# Patient Record
Sex: FEMALE | Race: WHITE | NOT HISPANIC OR LATINO | Employment: STUDENT | URBAN - METROPOLITAN AREA
[De-identification: names, ages, dates, MRNs, and addresses within clinical notes are randomized per-mention and may not be internally consistent; named-entity substitution may affect disease eponyms.]

---
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
## 2023-02-13 ENCOUNTER — OFFICE VISIT (OUTPATIENT)
Dept: OBSTETRICS AND GYNECOLOGY | Facility: CLINIC | Age: 18
End: 2023-02-13
Payer: COMMERCIAL

## 2023-02-13 VITALS — SYSTOLIC BLOOD PRESSURE: 112 MMHG | DIASTOLIC BLOOD PRESSURE: 70 MMHG | WEIGHT: 153.31 LBS

## 2023-02-13 DIAGNOSIS — Z30.431 IUD CHECK UP: Primary | ICD-10-CM

## 2023-02-13 PROCEDURE — 3078F PR MOST RECENT DIASTOLIC BLOOD PRESSURE < 80 MM HG: ICD-10-PCS | Mod: CPTII,S$GLB,,

## 2023-02-13 PROCEDURE — 1160F PR REVIEW ALL MEDS BY PRESCRIBER/CLIN PHARMACIST DOCUMENTED: ICD-10-PCS | Mod: CPTII,S$GLB,,

## 2023-02-13 PROCEDURE — 3078F DIAST BP <80 MM HG: CPT | Mod: CPTII,S$GLB,,

## 2023-02-13 PROCEDURE — 99212 OFFICE O/P EST SF 10 MIN: CPT | Mod: S$GLB,,,

## 2023-02-13 PROCEDURE — 1159F PR MEDICATION LIST DOCUMENTED IN MEDICAL RECORD: ICD-10-PCS | Mod: CPTII,S$GLB,,

## 2023-02-13 PROCEDURE — 99999 PR PBB SHADOW E&M-EST. PATIENT-LVL II: CPT | Mod: PBBFAC,,,

## 2023-02-13 PROCEDURE — 3074F PR MOST RECENT SYSTOLIC BLOOD PRESSURE < 130 MM HG: ICD-10-PCS | Mod: CPTII,S$GLB,,

## 2023-02-13 PROCEDURE — 1159F MED LIST DOCD IN RCRD: CPT | Mod: CPTII,S$GLB,,

## 2023-02-13 PROCEDURE — 1160F RVW MEDS BY RX/DR IN RCRD: CPT | Mod: CPTII,S$GLB,,

## 2023-02-13 PROCEDURE — 3074F SYST BP LT 130 MM HG: CPT | Mod: CPTII,S$GLB,,

## 2023-02-13 PROCEDURE — 99212 PR OFFICE/OUTPT VISIT, EST, LEVL II, 10-19 MIN: ICD-10-PCS | Mod: S$GLB,,,

## 2023-02-13 PROCEDURE — 99999 PR PBB SHADOW E&M-EST. PATIENT-LVL II: ICD-10-PCS | Mod: PBBFAC,,,

## 2023-02-13 RX ORDER — ISOTRETINOIN 40 MG/1
40 CAPSULE, LIQUID FILLED ORAL
COMMUNITY
Start: 2023-01-27

## 2023-02-13 NOTE — PROGRESS NOTES
Subjective:       Patient ID: Zofia Landis is a 17 y.o. female.    Chief Complaint:  IUD Check      History of Present Illness  HPI  Ms. Zofia Landis is a 16 y/o female who presents today as follow up for IUD placement. She missed her IUD check in October because she got busy in school. She was last seen on 2022 by Dr Silver at which time Noelle IUD was placed without complications. She denies complications since placement. She reports that she bled for a few weeks after placement then stopped.     GYN & OB History  Patient's last menstrual period was 2023 (exact date).   Date of Last Pap: No result found    OB History    Para Term  AB Living   0 0 0 0 0 0   SAB IAB Ectopic Multiple Live Births   0 0 0 0 0     Past Medical History:  Past Medical History:   Diagnosis Date    Allergy        Past Surgical History:  Past Surgical History:   Procedure Laterality Date    APPLICATION OF CARTILAGE GRAFT N/A 2020    Procedure: APPLICATION, CARTILAGE GRAFT;  Surgeon: Armaan Richardson III, MD;  Location: [facility];  Service: Plastics;  Laterality: N/A;    NASAL RECONSTRUCTION N/A 2020    Procedure: RECONSTRUCTION, NOSE with possible osteotomies;  Surgeon: Armaan Richardson III, MD;  Location: [facility];  Service: Plastics;  Laterality: N/A;  FOLLOW DR. RICHARDSON ANESTHESIA   PROTOCAL    NASAL SEPTOPLASTY N/A 2020    Procedure: SEPTOPLASTY, NOSE;  Surgeon: Armaan Rcihardson III, MD;  Location: [facility];  Service: Plastics;  Laterality: N/A;       Family History:  Family History   Problem Relation Age of Onset    No Known Problems Mother     No Known Problems Father     Cataracts Paternal Grandmother     Cataracts Paternal Grandfather     Congenital heart disease Sister         TOF     Diabetes Neg Hx     Glaucoma Neg Hx     Macular degeneration Neg Hx     Retinal detachment Neg Hx     Blindness Neg Hx        Allergies:  Review of patient's allergies indicates:  No Known  Allergies    Medications:  Current Outpatient Medications on File Prior to Visit   Medication Sig Dispense Refill    MYORISAN 40 mg capsule Take 40 mg by mouth.      doxycycline (VIBRA-TABS) 100 MG tablet Take 100 mg by mouth once daily.      valACYclovir (VALTREX) 500 MG tablet Take 2 tablets (1,000 mg total) by mouth once daily. for 5 days 10 tablet 0    [DISCONTINUED] dapsone (ACZONE) 5 % topical gel Apply topically 2 (two) times daily. (Patient not taking: Reported on 8/9/2022) 60 g 2    [DISCONTINUED] hydrocortisone 2.5 % cream Apply topically 2 (two) times daily. Up to 2 weeks at a time (Patient not taking: Reported on 8/9/2022) 30 g 1    [DISCONTINUED] norethindrone-ethinyl estradiol (MICROGESTIN 1/20) 1-20 mg-mcg per tablet Take 1 tablet by mouth once daily. (Patient not taking: Reported on 8/9/2022) 30 tablet 11     Current Facility-Administered Medications on File Prior to Visit   Medication Dose Route Frequency Provider Last Rate Last Admin    levonorgestreL (SYD) 14 mcg/24 hrs (3 yrs) 13.5 mg IUD 14 mcg  14 mcg Intrauterine  Serena Silver MD   14 mcg at 08/09/22 6745       Social History:  Social History     Tobacco Use    Smoking status: Never    Smokeless tobacco: Never   Substance Use Topics    Alcohol use: No    Drug use: No        Review of Systems  Review of Systems   Constitutional:  Negative for activity change and appetite change.   Respiratory:  Negative for shortness of breath.    Cardiovascular:  Negative for chest pain.   Gastrointestinal:  Negative for abdominal pain, diarrhea and nausea.   Genitourinary:  Negative for bladder incontinence, decreased libido, dysmenorrhea, dyspareunia, dysuria, flank pain, frequency, genital sores, hematuria, hot flashes, menorrhagia, menstrual problem, pelvic pain, urgency, vaginal bleeding, vaginal discharge, vaginal pain, urinary incontinence, postcoital bleeding, postmenopausal bleeding, vaginal dryness and vaginal odor.   Integumentary:   Negative for breast tenderness.   Neurological:  Negative for headaches.   Breast: Negative for breast self exam and tenderness        Objective:    Physical Exam:   Constitutional: She is oriented to person, place, and time. She appears well-developed and well-nourished.    HENT:   Head: Normocephalic.      Cardiovascular:       Exam reveals no clubbing.        Pulmonary/Chest: Effort normal and breath sounds normal.        Abdominal: Soft. There is no abdominal tenderness. Hernia confirmed negative in the right inguinal area and confirmed negative in the left inguinal area.     Genitourinary:    Inguinal canal, vagina, uterus, right adnexa, left adnexa and rectum normal.   Rectum:      No tenderness.      Pelvic exam was performed with patient supine.   The external female genitalia was normal.   No external genitalia lesions identified,Genitalia hair distrobution normal .   Labial bartholins normal.Cervix is normal. No  no vaginal discharge or tenderness in the vagina.    No signs of injury in the vagina.   Vagina was moist.Cervix exhibits no discharge and no tenderness. Uterus is not tender. Normal urethral meatus.Urethra findings: no tendernessBladder findings: no bladder tendernessIUD strings visualized.          Musculoskeletal: Normal range of motion and moves all extremeties.      Lymphadenopathy: No inguinal adenopathy noted on the right or left side.    Neurological: She is alert and oriented to person, place, and time.    Skin: Skin is warm. Nails show no clubbing.    Psychiatric: She has a normal mood and affect. Her behavior is normal. Judgment and thought content normal.        Assessment:        1. IUD check up               Plan:      1. IUD check up     Patient counseled on IUD danger signs and how to check the strings reviewed.    Return for annual GYN exam

## 2023-04-24 LAB
CHOL/HDLC RATIO: 3.1
CHOLESTEROL, TOTAL: 163
EGFR: 114 ML/MIN/1.73M2
HDLC SERPL-MCNC: 53 MG/DL
LDLC SERPL CALC-MCNC: 88 MG/DL
NONHDLC SERPL-MCNC: 110 MG/DL
TRIGL SERPL-MCNC: 128 MG/DL

## 2023-05-25 LAB
CHOL/HDLC RATIO: 2.7
CHOLESTEROL, TOTAL: 153
EGFR: 106 ML/MIN/1.73M2
HDLC SERPL-MCNC: 57 MG/DL
LDLC SERPL CALC-MCNC: 80 MG/DL
NONHDLC SERPL-MCNC: 96 MG/DL
TRIGL SERPL-MCNC: 77 MG/DL

## 2024-02-27 ENCOUNTER — PATIENT OUTREACH (OUTPATIENT)
Dept: ADMINISTRATIVE | Facility: HOSPITAL | Age: 19
End: 2024-02-27
Payer: COMMERCIAL

## 2024-02-27 RX ORDER — DEXTROMETHORPHAN HBR AND PYRILAMINE MALEATE 7.5; 7.5 MG/5ML; MG/5ML
LIQUID ORAL
COMMUNITY
Start: 2023-10-20

## 2024-02-27 RX ORDER — FLUTICASONE PROPIONATE 50 MCG
SPRAY, SUSPENSION (ML) NASAL
COMMUNITY
Start: 2023-10-19

## 2024-02-27 RX ORDER — CETIRIZINE HYDROCHLORIDE 10 MG/1
10 TABLET ORAL EVERY MORNING
COMMUNITY
Start: 2023-10-19

## 2024-02-27 NOTE — PROGRESS NOTES
Sebastien Panel Report 02.16.24 - Left message for patient to call our office. Please schedule annual physical.

## 2024-04-15 ENCOUNTER — PATIENT MESSAGE (OUTPATIENT)
Dept: PEDIATRICS | Facility: CLINIC | Age: 19
End: 2024-04-15
Payer: COMMERCIAL

## 2024-07-10 DIAGNOSIS — Z11.59 NEED FOR HEPATITIS C SCREENING TEST: ICD-10-CM

## 2024-07-15 ENCOUNTER — OFFICE VISIT (OUTPATIENT)
Dept: FAMILY MEDICINE | Facility: CLINIC | Age: 19
End: 2024-07-15
Payer: COMMERCIAL

## 2024-07-15 DIAGNOSIS — R10.32 LEFT LOWER QUADRANT ABDOMINAL PAIN: Primary | ICD-10-CM

## 2024-07-15 DIAGNOSIS — M54.50 ACUTE RIGHT-SIDED LOW BACK PAIN WITHOUT SCIATICA: ICD-10-CM

## 2024-07-15 PROCEDURE — 99214 OFFICE O/P EST MOD 30 MIN: CPT | Mod: 95,,, | Performed by: INTERNAL MEDICINE

## 2024-07-15 PROCEDURE — 1160F RVW MEDS BY RX/DR IN RCRD: CPT | Mod: CPTII,95,, | Performed by: INTERNAL MEDICINE

## 2024-07-15 PROCEDURE — 1159F MED LIST DOCD IN RCRD: CPT | Mod: CPTII,95,, | Performed by: INTERNAL MEDICINE

## 2024-07-16 RX ORDER — CYCLOBENZAPRINE HCL 5 MG
5 TABLET ORAL 3 TIMES DAILY PRN
Qty: 30 TABLET | Refills: 0 | Status: SHIPPED | OUTPATIENT
Start: 2024-07-16 | End: 2024-07-26

## 2024-07-17 ENCOUNTER — HOSPITAL ENCOUNTER (OUTPATIENT)
Dept: RADIOLOGY | Facility: HOSPITAL | Age: 19
Discharge: HOME | End: 2024-07-17
Attending: INTERNAL MEDICINE
Payer: COMMERCIAL

## 2024-07-17 DIAGNOSIS — R10.32 LEFT LOWER QUADRANT ABDOMINAL PAIN: ICD-10-CM

## 2024-07-19 ENCOUNTER — TELEPHONE (OUTPATIENT)
Dept: FAMILY MEDICINE | Facility: CLINIC | Age: 19
End: 2024-07-19
Payer: COMMERCIAL

## 2024-07-19 PROBLEM — R10.32 LEFT LOWER QUADRANT ABDOMINAL PAIN: Status: ACTIVE | Noted: 2024-07-19

## 2024-07-19 PROBLEM — M54.50 ACUTE RIGHT-SIDED LOW BACK PAIN WITHOUT SCIATICA: Status: ACTIVE | Noted: 2024-07-19

## 2024-07-19 NOTE — TELEPHONE ENCOUNTER
----- Message from Angela Vega sent at 7/19/2024 11:19 AM CDT -----  Contact: Zofia  .Patient is calling to speak with the nurse regarding orders. Reports needing order for pelvis scan so the patient can schedule. Please give patient a call back at   [phone]

## 2024-07-19 NOTE — PROGRESS NOTES
Chief Complaint: No chief complaint on file.      Patient Location: [place]   Audio or Audiovisual and Time: Audiovisual, 15 mins     Zofia Landis  is a 19 y.o. year old patient who presents today for lower left abd pain     Patient has been having right lower back pain for about two weeks. Patient reports lower left abd/pelvic pain which started after her right back pain. The back pain is improving. The abd pain is intermittent.     Past Medical History:   Diagnosis Date    Allergy        Past Surgical History:   Procedure Laterality Date    APPLICATION OF CARTILAGE GRAFT N/A 6/26/2020    Procedure: APPLICATION, CARTILAGE GRAFT;  Surgeon: Armaan Richardson III, MD;  [facility];  Service: Plastics;  Laterality: N/A;    NASAL RECONSTRUCTION N/A 6/26/2020    Procedure: RECONSTRUCTION, NOSE with possible osteotomies;  Surgeon: Armaan Richardson III, MD;  [facility];  Service: Plastics;  Laterality: N/A;  FOLLOW DR. RICHARDSON ANESTHESIA   PROTOCAL    NASAL SEPTOPLASTY N/A 6/26/2020    Procedure: SEPTOPLASTY, NOSE;  Surgeon: Armaan Richardson III, MD;  [facility];  Service: Plastics;  Laterality: N/A;        Family History   Problem Relation Name Age of Onset    No Known Problems Mother ria     No Known Problems Father eris     Cataracts Paternal Grandmother      Cataracts Paternal Grandfather      Congenital heart disease Sister olvia         TOF     Diabetes Neg Hx      Glaucoma Neg Hx      Macular degeneration Neg Hx      Retinal detachment Neg Hx      Blindness Neg Hx          Social History     Socioeconomic History    Marital status: Single   Tobacco Use    Smoking status: Never    Smokeless tobacco: Never   Substance and Sexual Activity    Alcohol use: No    Drug use: No   Social History Narrative    She is in college at [employer].     Social Determinants of Health     Financial Resource Strain: Low Risk  (7/15/2024)    Overall Financial Resource Strain ([facility])      Difficulty of Paying Living Expenses: Not hard at all   Food Insecurity: No Food Insecurity (7/15/2024)    Hunger Vital Sign     Worried About Running Out of Food in the Last Year: Never true     Ran Out of Food in the Last Year: Never true   Physical Activity: Sufficiently Active (7/15/2024)    Exercise Vital Sign     Days of Exercise per Week: 5 days     Minutes of Exercise per Session: 60 min   Stress: No Stress Concern Present (7/15/2024)    [employer] - Occupational Stress Questionnaire     Feeling of Stress : Not at all   Housing Stability: Unknown (7/15/2024)    Housing Stability Vital Sign     Unable to Pay for Housing in the Last Year: No         Current Outpatient Medications:     CAPRON DM 7.5-7.5 mg/5 mL Liqd, SMARTSIG:10 Milliliter(s) By Mouth Every 8 Hours PRN, Disp: , Rfl:     cetirizine (ZYRTEC) 10 MG tablet, Take 10 mg by mouth every morning., Disp: , Rfl:     cyclobenzaprine (FLEXERIL) 5 MG tablet, Take 1 tablet (5 mg total) by mouth 3 (three) times daily as needed for Muscle spasms., Disp: 30 tablet, Rfl: 0    doxycycline (VIBRA-TABS) 100 MG tablet, Take 100 mg by mouth once daily., Disp: , Rfl:     fluticasone propionate (FLONASE) 50 mcg/actuation nasal spray, by Each Nostril route., Disp: , Rfl:     IBUPROFEN ORAL, Take by mouth., Disp: , Rfl:     MYORISAN 40 mg capsule, Take 40 mg by mouth., Disp: , Rfl:     valACYclovir (VALTREX) 500 MG tablet, Take 2 tablets (1,000 mg total) by mouth once daily. for 5 days, Disp: 10 tablet, Rfl: 0    Current Facility-Administered Medications:     levonorgestreL (SYD) 14 mcg/24 hrs (3 yrs) 13.5 mg IUD 14 mcg, 14 mcg, Intrauterine, , Serena Silver MD, 14 mcg at 08/09/22 1546     Review of Systems   Gastrointestinal:  Positive for abdominal pain. Negative for blood in stool, constipation, diarrhea, melena, nausea and vomiting.   Genitourinary:  Negative for dysuria.   Musculoskeletal:  Positive for back pain and myalgias.         Physical Exam not conducted due to virtual visit    Assessment:       1. Left lower quadrant abdominal pain    2. Acute right-sided low back pain without sciatica          Plan:   1. Left lower quadrant abdominal pain  Assessment & Plan:  Acute, intermittent, unresolved. Likely hip pain from compensating right back pain? Possible gyn source of pain?   Patient denies melena, constipation or blood in the stool     - US abd     Orders:  -     Cancel: US Abdomen Limited; Future; Expected date: 07/16/2024    2. Acute right-sided low back pain without sciatica  Assessment & Plan:  Acute, improving.     - counseled on stretches   - start flexeril PRN   - continue ibuprofen PRN    Orders:  -     cyclobenzaprine (FLEXERIL) 5 MG tablet; Take 1 tablet (5 mg total) by mouth 3 (three) times daily as needed for Muscle spasms.  Dispense: 30 tablet; Refill: 0         No follow-ups on file.

## 2024-07-19 NOTE — ASSESSMENT & PLAN NOTE
Acute, intermittent, unresolved. Likely hip pain from compensating right back pain? Possible gyn source of pain?   Patient denies melena, constipation or blood in the stool     - US abd

## 2024-07-19 NOTE — TELEPHONE ENCOUNTER
Informed pt orders are in the system; she states she doesn't see it listed in her le[facility]ne so she can schedule; when I looked further it was scheduled for 7/17 in [place]; I was unable to unlink the order and pt able to see it in her portal to schedule

## 2024-08-08 ENCOUNTER — PATIENT OUTREACH (OUTPATIENT)
Dept: ADMINISTRATIVE | Facility: HOSPITAL | Age: 19
End: 2024-08-08
Payer: COMMERCIAL

## 2024-08-15 ENCOUNTER — OFFICE VISIT (OUTPATIENT)
Dept: OBSTETRICS AND GYNECOLOGY | Facility: CLINIC | Age: 19
End: 2024-08-15
Payer: COMMERCIAL

## 2024-08-15 VITALS
BODY MASS INDEX: 26.47 KG/M2 | WEIGHT: 164.69 LBS | DIASTOLIC BLOOD PRESSURE: 70 MMHG | HEIGHT: 66 IN | SYSTOLIC BLOOD PRESSURE: 116 MMHG

## 2024-08-15 DIAGNOSIS — R10.2 PELVIC PAIN: ICD-10-CM

## 2024-08-15 DIAGNOSIS — Z01.419 WELL WOMAN EXAM WITH ROUTINE GYNECOLOGICAL EXAM: Primary | ICD-10-CM

## 2024-08-15 DIAGNOSIS — Z11.3 SCREENING EXAMINATION FOR STD (SEXUALLY TRANSMITTED DISEASE): ICD-10-CM

## 2024-08-15 DIAGNOSIS — Z12.39 SCREENING BREAST EXAMINATION: ICD-10-CM

## 2024-08-15 PROCEDURE — 88175 CYTOPATH C/V AUTO FLUID REDO: CPT

## 2024-08-15 PROCEDURE — 87491 CHLMYD TRACH DNA AMP PROBE: CPT

## 2024-08-15 PROCEDURE — 3008F BODY MASS INDEX DOCD: CPT | Mod: CPTII,S$GLB,,

## 2024-08-15 PROCEDURE — 3078F DIAST BP <80 MM HG: CPT | Mod: CPTII,S$GLB,,

## 2024-08-15 PROCEDURE — 99395 PREV VISIT EST AGE 18-39: CPT | Mod: S$GLB,,,

## 2024-08-15 PROCEDURE — 99999 PR PBB SHADOW E&M-EST. PATIENT-LVL III: CPT | Mod: PBBFAC,,,

## 2024-08-15 PROCEDURE — 3074F SYST BP LT 130 MM HG: CPT | Mod: CPTII,S$GLB,,

## 2024-08-15 PROCEDURE — 1159F MED LIST DOCD IN RCRD: CPT | Mod: CPTII,S$GLB,,

## 2024-08-15 NOTE — PROGRESS NOTES
CC: Annual  HISTORY OF PRESENT ILLNESS:    Zofia Landis is a 19 y.o. female, , Patient's last menstrual period was 2024 (exact date).,  presents for a routine exam and has complaints of pelvic pain that started about 2-3 weeks ago. She also reports discharge and odor.   PCP ordered a PUS with normal results and intact IUD on .   FINDINGS:  Uterus measures 6.9 x 2.7 x 3.8 cm.  Endometrium measures 0.3 cm.  IUD is present. Right ovary measures 3.4 x 1.8 x 1.9 cm.  Left ovary measures 2.8 x 1.5 x 2.6 cm.  Normal vascular flow is noted to the ovaries.  No adnexal masses or free fluid.  Impression:1. No abnormality demonstrated.     She is sexually active. She uses Noelle IUD for contraception.  History of STDs: denies.  She does want STD screening.  Denies any other GYN complaints.      The patient participates in regular exercise: yes.  The patient does not smoke.  The patient wears seatbelts.   Pt denies any domestic violence. reports tattoos or blood transfusions    SCREENING:   Pap: first today   Gardasil Status:  VACCINATED x1 (needs 2 more doses)  Mammogram: N/A  Colonoscopy: N/A   DEXA:  N/A     GYN FH:   Breast cancer: MGM  Colon cancer: none  Ovarian cancer: none  Endometrial cancer: none     OB History    Para Term  AB Living   0 0 0 0 0 0   SAB IAB Ectopic Multiple Live Births   0 0 0 0 0        Past Medical History:  Past Medical History:   Diagnosis Date    Allergy        Past Surgical History:  Past Surgical History:   Procedure Laterality Date    APPLICATION OF CARTILAGE GRAFT N/A 2020    Procedure: APPLICATION, CARTILAGE GRAFT;  Surgeon: Armaan Richardson III, MD;  Location: [facility] OR;  Service: Plastics;  Laterality: N/A;    NASAL RECONSTRUCTION N/A 2020    Procedure: RECONSTRUCTION, NOSE with possible osteotomies;  Surgeon: Armaan Richardson III, MD;  Location: [facility];  Service: Plastics;  Laterality: N/A;  FOLLOW DR. RICHARDSON ANESTHESIA   PROTOCAL    NASAL  SEPTOPLASTY N/A 6/26/2020    Procedure: SEPTOPLASTY, NOSE;  Surgeon: Armaan Richardson III, MD;  Location: [facility] OR;  Service: Plastics;  Laterality: N/A;       Family History:  Family History   Problem Relation Name Age of Onset    No Known Problems Mother ria     No Known Problems Father eris     Congenital heart disease Sister florentino         TOF     Breast cancer Maternal Grandmother      Cataracts Paternal Grandmother      Cataracts Paternal Grandfather      Diabetes Neg Hx      Glaucoma Neg Hx      Macular degeneration Neg Hx      Retinal detachment Neg Hx      Blindness Neg Hx      Colon cancer Neg Hx      Ovarian cancer Neg Hx      Cervical cancer Neg Hx      Uterine cancer Neg Hx         Allergies:  Review of patient's allergies indicates:  No Known Allergies    Medications:  Current Outpatient Medications on File Prior to Visit   Medication Sig Dispense Refill    valACYclovir (VALTREX) 500 MG tablet Take 2 tablets (1,000 mg total) by mouth once daily. for 5 days 10 tablet 0    [DISCONTINUED] CAPRON DM 7.5-7.5 mg/5 mL Liqd SMARTSIG:10 Milliliter(s) By Mouth Every 8 Hours PRN (Patient not taking: Reported on 8/15/2024)      [DISCONTINUED] cetirizine (ZYRTEC) 10 MG tablet Take 10 mg by mouth every morning. (Patient not taking: Reported on 8/15/2024)      [DISCONTINUED] doxycycline (VIBRA-TABS) 100 MG tablet Take 100 mg by mouth once daily. (Patient not taking: Reported on 8/15/2024)      [DISCONTINUED] fluticasone propionate (FLONASE) 50 mcg/actuation nasal spray by Each Nostril route. (Patient not taking: Reported on 8/15/2024)      [DISCONTINUED] IBUPROFEN ORAL Take by mouth. (Patient not taking: Reported on 8/15/2024)      [DISCONTINUED] MYORISAN 40 mg capsule Take 40 mg by mouth. (Patient not taking: Reported on 8/15/2024)       Current Facility-Administered Medications on File Prior to Visit   Medication Dose Route Frequency Provider Last Rate Last Admin    levonorgestreL (SYD) 14 mcg/24 hrs (3  yrs) 13.5 mg IUD 14 mcg  14 mcg Intrauterine  Serena Silver MD   14 mcg at 08/09/22 9616       Social History:  Social History     Tobacco Use    Smoking status: Never     Passive exposure: Never    Smokeless tobacco: Never   Substance Use Topics    Alcohol use: No    Drug use: No               ROS:  GENERAL: Feeling well overall. Denies fever or chills.   SKIN: Denies rash or lesions.   HEAD: Denies head injury or headache.   NODES: Denies enlarged lymph nodes.   CHEST: Denies chest pain or shortness of breath.   CARDIOVASCULAR: Denies palpitations or left sided chest pain.   ABDOMEN: No abdominal pain, constipation, diarrhea, nausea, vomiting or rectal bleeding.   URINARY: No dysuria, hematuria, or burning on urination.  REPRODUCTIVE: See HPI.   BREASTS: Denies pain, lumps, or nipple discharge.   HEMATOLOGIC: No easy bruisability or excessive bleeding.   MUSCULOSKELETAL: Denies joint pain or swelling.   NEUROLOGIC: Denies syncope or weakness.   PSYCHIATRIC: Denies depression, anxiety or mood swings.    PE:   APPEARANCE: Well nourished, well developed, White female in no acute distress.  NODES: no cervical, supraclavicular, or inguinal lymphadenopathy  BREASTS: Symmetrical, no skin changes or visible lesions. No palpable masses, nipple discharge or adenopathy bilaterally.  ABDOMEN: Soft. No tenderness or masses. No distention. No hernias palpated. No CVA tenderness.  VULVA: No lesions. Normal external female genitalia.  URETHRAL MEATUS: Normal size and location, no lesions, no prolapse.  URETHRA: No masses, tenderness, or prolapse.  VAGINA:  Moist. No lesions or lacerations noted. + thin white discharge present.   CERVIX: No lesions or discharge. No cervical motion tenderness. IUD strings visible at os (2 cm out)  UTERUS: Normal size, regular shape, mobile, non-tender.  ADNEXA: No tenderness. No fullness or masses palpated in the adnexal regions.   ANUS PERINEUM: Normal.      Diagnosis:  1. Well woman exam with  routine gynecological exam    2. Screening examination for STD (sexually transmitted disease)    3. Screening breast examination    4. Pelvic pain        Plan:     Orders Placed This Encounter    C. trachomatis/N. gonorrhoeae by AMP DNA [facility]; Cervicovaginal    Liquid-Based Pap Smear, Screening       - Pap done today.  - Screening tests as ordered.  - Diet and exercise encouraged.  Condom use encouraged for STD prevention.  Seat belt use encouraged.  Reviewed ASCCP Pap guidelines and screening recommendations.  Calcium and vitamin D recommended.     Counseling: injury prevention: Driving under the influence of alcohol  Weapons  Seatbelts  Bicycle helmets  School performance  Adequate sleep  Exercise  Perimenopause/Menopause  Stress management techniques    The patient was counseled today on ACS PAP guidelines, with recommendations for yearly pelvic exams unless their uterus, cervix, and ovaries were removed for benign reasons; in that case, examinations every 3-5 years are recommended.  The patient was also counseled regarding monthly breast self-examination, routine STD screening for at-risk populations, prophylactic immunizations for transmitted infections such as  HPV, Pertussis, or Influenza as appropriate, and yearly mammograms when indicated by ACS guidelines.  She was advised to see her primary care physician for all other health maintenance.    Counseling session lasted approximately 10 minutes, and all her questions were answered.    Follow-up with me in 1 year for routine exam or sooner if needed.    NIKOS Lentz-BC

## 2024-08-16 LAB
CLINICAL INFO: NORMAL
DATE OF PREVIOUS PAP: NORMAL
DATE PREVIOUS BX: NO
LMP START DATE: NORMAL
SPECIMEN SOURCE CVX/VAG CYTO: NORMAL

## 2024-08-17 LAB
C TRACH DNA SPEC QL NAA+PROBE: NOT DETECTED
N GONORRHOEA DNA SPEC QL NAA+PROBE: NOT DETECTED

## 2024-08-19 ENCOUNTER — OFFICE VISIT (OUTPATIENT)
Dept: GASTROENTEROLOGY | Facility: CLINIC | Age: 19
End: 2024-08-19
Payer: COMMERCIAL

## 2024-08-19 ENCOUNTER — HOSPITAL ENCOUNTER (OUTPATIENT)
Dept: RADIOLOGY | Facility: HOSPITAL | Age: 19
Discharge: HOME | End: 2024-08-19
Payer: COMMERCIAL

## 2024-08-19 VITALS
HEIGHT: 66 IN | DIASTOLIC BLOOD PRESSURE: 77 MMHG | BODY MASS INDEX: 26.43 KG/M2 | WEIGHT: 164.44 LBS | HEART RATE: 65 BPM | SYSTOLIC BLOOD PRESSURE: 116 MMHG

## 2024-08-19 DIAGNOSIS — R10.32 LEFT LOWER QUADRANT PAIN: Primary | ICD-10-CM

## 2024-08-19 DIAGNOSIS — R10.32 LEFT LOWER QUADRANT PAIN: ICD-10-CM

## 2024-08-19 PROCEDURE — 25500020 PHARM REV CODE 255

## 2024-08-19 PROCEDURE — 1160F RVW MEDS BY RX/DR IN RCRD: CPT | Mod: CPTII,S$GLB,,

## 2024-08-19 PROCEDURE — 3078F DIAST BP <80 MM HG: CPT | Mod: CPTII,S$GLB,,

## 2024-08-19 PROCEDURE — 74177 CT ABD & PELVIS W/CONTRAST: CPT | Mod: 26,,, | Performed by: RADIOLOGY

## 2024-08-19 PROCEDURE — 99999 PR PBB SHADOW E&M-EST. PATIENT-LVL III: CPT | Mod: PBBFAC,,,

## 2024-08-19 PROCEDURE — 74177 CT ABD & PELVIS W/CONTRAST: CPT | Mod: TC

## 2024-08-19 PROCEDURE — 3074F SYST BP LT 130 MM HG: CPT | Mod: CPTII,S$GLB,,

## 2024-08-19 PROCEDURE — 3008F BODY MASS INDEX DOCD: CPT | Mod: CPTII,S$GLB,,

## 2024-08-19 PROCEDURE — 99204 OFFICE O/P NEW MOD 45 MIN: CPT | Mod: S$GLB,,,

## 2024-08-19 PROCEDURE — 1159F MED LIST DOCD IN RCRD: CPT | Mod: CPTII,S$GLB,,

## 2024-08-19 RX ORDER — DICYCLOMINE HYDROCHLORIDE 10 MG/1
10 CAPSULE ORAL
Qty: 30 CAPSULE | Refills: 1 | Status: SHIPPED | OUTPATIENT
Start: 2024-08-19

## 2024-08-19 RX ADMIN — IOHEXOL 75 ML: 350 INJECTION, SOLUTION INTRAVENOUS at 04:08

## 2024-08-19 RX ADMIN — IOHEXOL 15 ML: 300 INJECTION, SOLUTION INTRAVENOUS at 04:08

## 2024-08-19 NOTE — PROGRESS NOTES
"    [facility] Consultation Note    Reason for Consult:  The encounter diagnosis was Left lower quadrant pain.    PCP:   Silvia Crowe   [id number] [place] [id number]    Referring MD:  Silvia Crowe Md  [address] [address]    HPI:  This is a 19 y.o. female here for evaluation of intermittent LLQ throbbing abdominal pain x 2 months. States she experiences the pain once daily and it lasts for about 30 minutes. Associated with nausea that lasted a few days. Pain is not alleviated/exacerbated with food or BM. Reports daily, formed BM without straining or rectal bleeding. Tolerating food and liquids. Denies vomiting, diarrhea, constipation, fullness, reflux, bloody stools, melena, and weight loss. Pt was evaluated by PCP and OBGYN with negative pelvic US. Denies known FHx of GI malignancies. Denies tobacco and alcohol use.    Objective Findings:    Vital Signs:  /77   Pulse 65   Ht 5' 6" (1.676 m)   Wt 74.6 kg (164 lb 7.4 oz)   LMP 07/22/2024 (Exact Date)   BMI 26.55 kg/m²   Body mass index is 26.55 kg/m².    Physical Exam:  General Appearance: Well appearing in no acute distress  Abdomen: TTP in LLQ. No rebound, guarding, or rigidity. Soft, non distended with positive bowel sounds in all four quadrants. No hepatosplenomegaly, ascites, or mass.     I have personally reviewed labs and imaging results.     US Pelvis Complete (7/24/2024)  Impression: No abnormality demonstrated.     Assessment:  1. Left lower quadrant pain      This is a healthy 19 y.o F with 2 months of LLQ throbbing abdominal pain that comes on once a day and lasts for about 30 minutes. No correlation with food intake and/or BM. No other symptoms. Regular BM. She had an unremarkable pelvic US recently.     - Ordered CT Abdomen Pelvis  - Take Bentyl as needed for abdominal pain  - Maintain adequate hydration    RTC as needed.    Order summary:  Orders Placed This Encounter    CT Abdomen " Pelvis With IV Contrast Routine Oral Contrast    dicyclomine (BENTYL) 10 MG capsule     Thank you so much for allowing me to participate in the care of Sheridan E Arnold Sarah Abukhader, PA-C [facility]

## 2025-01-17 ENCOUNTER — OFFICE VISIT (OUTPATIENT)
Dept: OBSTETRICS AND GYNECOLOGY | Facility: CLINIC | Age: 20
End: 2025-01-17
Payer: COMMERCIAL

## 2025-01-17 VITALS
BODY MASS INDEX: 26.26 KG/M2 | WEIGHT: 162.69 LBS | SYSTOLIC BLOOD PRESSURE: 120 MMHG | DIASTOLIC BLOOD PRESSURE: 76 MMHG

## 2025-01-17 DIAGNOSIS — N93.9 ABNORMAL UTERINE BLEEDING (AUB): Primary | ICD-10-CM

## 2025-01-17 DIAGNOSIS — Z30.9 ENCOUNTER FOR CONTRACEPTIVE MANAGEMENT, UNSPECIFIED TYPE: ICD-10-CM

## 2025-01-17 DIAGNOSIS — N89.8 VAGINAL DISCHARGE: ICD-10-CM

## 2025-01-17 PROCEDURE — 81515 NFCT DS BV&VAGINITIS DNA ALG: CPT | Performed by: OBSTETRICS & GYNECOLOGY

## 2025-01-17 PROCEDURE — 99999 PR PBB SHADOW E&M-EST. PATIENT-LVL III: CPT | Mod: PBBFAC,,, | Performed by: OBSTETRICS & GYNECOLOGY

## 2025-01-17 PROCEDURE — 3078F DIAST BP <80 MM HG: CPT | Mod: CPTII,S$GLB,, | Performed by: OBSTETRICS & GYNECOLOGY

## 2025-01-17 PROCEDURE — 99214 OFFICE O/P EST MOD 30 MIN: CPT | Mod: S$GLB,,, | Performed by: OBSTETRICS & GYNECOLOGY

## 2025-01-17 PROCEDURE — 3008F BODY MASS INDEX DOCD: CPT | Mod: CPTII,S$GLB,, | Performed by: OBSTETRICS & GYNECOLOGY

## 2025-01-17 PROCEDURE — 87491 CHLMYD TRACH DNA AMP PROBE: CPT | Performed by: OBSTETRICS & GYNECOLOGY

## 2025-01-17 PROCEDURE — 1159F MED LIST DOCD IN RCRD: CPT | Mod: CPTII,S$GLB,, | Performed by: OBSTETRICS & GYNECOLOGY

## 2025-01-17 PROCEDURE — 3074F SYST BP LT 130 MM HG: CPT | Mod: CPTII,S$GLB,, | Performed by: OBSTETRICS & GYNECOLOGY

## 2025-01-17 NOTE — PROGRESS NOTES
"Chief Complaint   Patient presents with    Menstrual Problem     Pt states she's been having longer cyclesthat typically last 2 weeks.    vaginal odor     Pt vaginal odor started 3 months ago.     Dysmenorrhea     Pt states she's having "extreme camping" durinf cycles and when she;s not on her cycles. Cramping been going on since 2024       Zofia Landis is a 19 y.o. female  presents with complaint of vaginal odor, abnormal uterine bleeding, & dysmenorrhea    Vaginal Odor:   Complains of vaginal odor, since 2024. Normal testing at that time. Worsening about a month ago.   She denies itching.    reports odor.    She states the discharge is regular.    OTC meds: none  H/o Yeast: none  H/o BV: none  H/o STDs or new partner: none    Abnormal uterine bleeding & Dysmenorrhea:   Patient's last menstrual period was 01/10/2025 (exact date).   Menarche: 9  Cycles: Irregular for the last month, complains of prolonged bleeding for a month, frequent cycles, starting again two weeks after the last cycle.   Previously cycles lasting 7-8 days. Flow is heavy, using about 5 tampons per day.   Dysmenorrhea: yes, moderate. She has tried Tylenol with improvement.   Current Contraception: IUD, Noelle, since 10/2022. Plan for another IUD once this one comes out.   Prior Contraception: pill, complains of acne and weight gain  Sexually Active: yes    Pelvic pain:   Complains of constant cramping even when she is not on her cycle. Since Aug 2024.   Last pap: 2024 NILM  Denies history of abnormal paps  2024 Ultrasound FINDINGS:  Uterus measures 6.9 x 2.7 x 3.8 cm.  Endometrium measures 0.3 cm.  IUD is present.  Right ovary measures 3.4 x 1.8 x 1.9 cm.  Left ovary measures 2.8 x 1.5 x 2.6 cm.  Normal vascular flow is noted to the ovaries.  No adnexal masses or free fluid.  Impression:  1. No abnormality demonstrated.  2024 CT A/P:   FINDINGS: Genital organs: IUD in appropriate position within the uterus.  Ovaries " demonstrate no significant abnormalities.No pelvic free fluid.  Impression:  1. No acute intra-abdominal/pelvic CT abnormalities to account for the reported history of left lower quadrant abdominal pain.  Chlamydia, Amplified DNA   Date Value Ref Range Status   08/15/2024 Not Detected Not Detected Final   2022 Not Detected Not Detected Final     N gonorrhoeae, amplified DNA   Date Value Ref Range Status   08/15/2024 Not Detected Not Detected Final   2022 Not Detected Not Detected Final         Past Medical History:   Diagnosis Date    Allergy      Past Surgical History:   Procedure Laterality Date    APPLICATION OF CARTILAGE GRAFT N/A 2020    Procedure: APPLICATION, CARTILAGE GRAFT;  Surgeon: Armaan Richardson III, MD;  Location: [facility];  Service: Plastics;  Laterality: N/A;    NASAL RECONSTRUCTION N/A 2020    Procedure: RECONSTRUCTION, NOSE with possible osteotomies;  Surgeon: Armaan Richardson III, MD;  Location: [facility];  Service: Plastics;  Laterality: N/A;  FOLLOW DR. RICHARDSON ANESTHESIA   PROTOCAL    NASAL SEPTOPLASTY N/A 2020    Procedure: SEPTOPLASTY, NOSE;  Surgeon: Armaan Richardson III, MD;  Location: [facility];  Service: Plastics;  Laterality: N/A;     Social History     Tobacco Use    Smoking status: Never     Passive exposure: Never    Smokeless tobacco: Never   Substance Use Topics    Alcohol use: No    Drug use: No     Family History   Problem Relation Name Age of Onset    No Known Problems Mother ria     No Known Problems Father eris     Congenital heart disease Sister olvia         TOF     Breast cancer Maternal Grandmother      Cataracts Paternal Grandmother      Cataracts Paternal Grandfather      Diabetes Neg Hx      Glaucoma Neg Hx      Macular degeneration Neg Hx      Retinal detachment Neg Hx      Blindness Neg Hx      Colon cancer Neg Hx      Ovarian cancer Neg Hx      Cervical cancer Neg Hx      Uterine cancer Neg Hx       OB History    Para Term  AB  Living   0 0 0 0 0 0   SAB IAB Ectopic Multiple Live Births   0 0 0 0 0       /76 (Patient Position: Sitting)   Wt 73.8 kg (162 lb 11.2 oz)   LMP 01/10/2025 (Exact Date)   BMI 26.26 kg/m²     Review of Systems  Constitutional: negative for chills and fatigue  Gastrointestinal: negative for abdominal pain, constipation, diarrhea, nausea and vomiting  Genitourinary:negative for abnormal menstrual periods, genital lesions and vaginal discharge, dysuria, frequency and hematuria    PHYSICAL EXAM:  VULVA: normal appearing vulva with no masses, tenderness or lesions   VAGINA: normal appearing vagina with normal color and no lesions , No discharge, non-tender  CERVIX: normal appearing cervix without discharge or lesions   UTERUS: uterus is normal size, shape, consistency and nontender   ADNEXA: normal adnexa in size, nontender and no masses    ASSESSMENT and PLAN:    ICD-10-CM ICD-9-CM    1. Abnormal uterine bleeding (AUB)  N93.9 626.9 CBC Auto Differential      TSH      HCG, Quantitative      US Pelvis Comp with Transvag NON-OB (xpd      2. Vaginal discharge  N89.8 623.5 Vaginosis Screen by DNA Probe      C. trachomatis/N. gonorrhoeae by AMP DNA [facility]; Cervicovaginal      3. Encounter for contraceptive management, unspecified type  Z30.9 V25.9 Device Authorization Order          Patient was counseled today on vaginitis prevention including :  a. avoiding feminine products such as deoderant soaps, body wash, bubble bath, douches, scented toilet paper, deoderant tampons or pads, feminine wipes, chronic pad use, etc.  b. avoiding other vulvovaginal irritants such as long hot baths, humidity, tight, synthetic clothing, chlorine and sitting around in wet bathing suits  c. wearing cotton underwear, avoiding thong underwear and no underwear to bed  d. taking showers instead of baths and use a hair dryer on cool setting afterwards to dry  e. wearing cotton to exercise and shower immediately after exercise and change  clothes  f. using polyurethane condoms without spermicide if sexually active and symptoms are triggered by intercourse    Discuss Noelle is about to .  Sometimes this can cause some irregular bleeding.  All blood can call the vaginal discharge.  I recommend removing the Noelle and replacing with a Mirena IUD.     FOLLOW UP: PRN lack of improvement.    I spent a total of 20 minutes on the day of the visit.This includes face to face time and non-face to face time preparing to see the patient (eg, review of tests), obtaining and/or reviewing separately obtained history, documenting clinical information in the electronic or other health record, independently interpreting results and communicating results to the patient/family/caregiver, or care coordinator.

## 2025-01-19 LAB
BACTERIAL VAGINOSIS DNA: NOT DETECTED
C TRACH DNA SPEC QL NAA+PROBE: NOT DETECTED
CANDIDA GLABRATA/KRUSEI: NOT DETECTED
CANDIDA RRNA VAG QL PROBE: NOT DETECTED
N GONORRHOEA DNA SPEC QL NAA+PROBE: NOT DETECTED
TRICHOMONAS VAGINALIS: NOT DETECTED

## 2025-01-30 ENCOUNTER — HOSPITAL ENCOUNTER (OUTPATIENT)
Dept: RADIOLOGY | Facility: HOSPITAL | Age: 20
Discharge: HOME | End: 2025-01-30
Attending: OBSTETRICS & GYNECOLOGY
Payer: COMMERCIAL

## 2025-01-30 DIAGNOSIS — N93.9 ABNORMAL UTERINE BLEEDING (AUB): ICD-10-CM

## 2025-01-30 PROCEDURE — 76856 US EXAM PELVIC COMPLETE: CPT | Mod: TC,PN

## 2025-01-30 PROCEDURE — 76830 TRANSVAGINAL US NON-OB: CPT | Mod: 26,,, | Performed by: RADIOLOGY

## 2025-01-30 PROCEDURE — 76376 3D RENDER W/INTRP POSTPROCES: CPT | Mod: 26,,, | Performed by: RADIOLOGY

## 2025-01-30 PROCEDURE — 76856 US EXAM PELVIC COMPLETE: CPT | Mod: 26,,, | Performed by: RADIOLOGY

## 2025-02-07 ENCOUNTER — PROCEDURE VISIT (OUTPATIENT)
Dept: OBSTETRICS AND GYNECOLOGY | Facility: CLINIC | Age: 20
End: 2025-02-07
Payer: COMMERCIAL

## 2025-02-07 VITALS
BODY MASS INDEX: 26.33 KG/M2 | SYSTOLIC BLOOD PRESSURE: 114 MMHG | WEIGHT: 163.81 LBS | DIASTOLIC BLOOD PRESSURE: 66 MMHG | HEIGHT: 66 IN

## 2025-02-07 DIAGNOSIS — Z30.432 ENCOUNTER FOR IUD REMOVAL: Primary | ICD-10-CM

## 2025-02-07 PROCEDURE — 58301 REMOVE INTRAUTERINE DEVICE: CPT | Mod: S$GLB,,, | Performed by: OBSTETRICS & GYNECOLOGY

## 2025-02-07 PROCEDURE — 88300 SURGICAL PATH GROSS: CPT | Mod: 26,,, | Performed by: STUDENT IN AN ORGANIZED HEALTH CARE EDUCATION/TRAINING PROGRAM

## 2025-02-07 PROCEDURE — 99499 UNLISTED E&M SERVICE: CPT | Mod: S$GLB,,, | Performed by: OBSTETRICS & GYNECOLOGY

## 2025-02-07 PROCEDURE — 88300 SURGICAL PATH GROSS: CPT | Performed by: STUDENT IN AN ORGANIZED HEALTH CARE EDUCATION/TRAINING PROGRAM

## 2025-02-07 NOTE — PROCEDURES
Removal of IUD    Date/Time: 2/7/2025 11:00 AM    Performed by: Mary Hanna MD  Authorized by: Mary Hanna MD    Consent given by:  Patient  Procedure risks and benefits discussed: yes    Patient questions answered: yes    Patient agrees, verbalizes understanding, and wants to proceed: yes    Educational handouts given: yes    Instructions and paperwork completed: yes    Implant grasped by: forceps  Removal due to infection and inflammatory reaction: no    Removal due to mechanical complications: no    Removed with no complications: yes    Removal due to expiration: yes

## 2025-02-10 LAB
FINAL PATHOLOGIC DIAGNOSIS: NORMAL
GROSS: NORMAL
Lab: NORMAL

## (undated) DEVICE — SUT 5-0 CHROMIC GUT / P-3

## (undated) DEVICE — BLADE SURGICAL 15C

## (undated) DEVICE — SPONGE DERMACEA 4X4IN 12PLY

## (undated) DEVICE — SPONGE COTTON TRAY 4X4IN

## (undated) DEVICE — APPLICATOR Q-TIPS BULK 3 INCH

## (undated) DEVICE — ELECTRODE NEEDLE 1IN

## (undated) DEVICE — PENCIL ROCKER SWITCH 10FT CORD

## (undated) DEVICE — DRESSING EYE OVAL LF

## (undated) DEVICE — GLOVE BIOGEL ECLIPSE SZ 7.5

## (undated) DEVICE — Device

## (undated) DEVICE — SEE MEDLINE ITEM 157194

## (undated) DEVICE — SEE MEDLINE ITEM 156934

## (undated) DEVICE — SUT 4/0 18IN PROLENE BL MON

## (undated) DEVICE — SKINMARKER & RULER REGULAR X-F

## (undated) DEVICE — HEMOSTAT SURGICEL 2X3IN

## (undated) DEVICE — BLADE INFERIOR TURBINATE 5/PK

## (undated) DEVICE — SEE MEDLINE ITEM 157117

## (undated) DEVICE — SPONGE GELFOAM 12-7MM

## (undated) DEVICE — SUT MONOCRYL 4-0 PS-1 UND

## (undated) DEVICE — SPLINT REUTER BIVALVE 0.5MM

## (undated) DEVICE — WARMER DRAPE STERILE LF

## (undated) DEVICE — SEE MEDLINE ITEM 146313

## (undated) DEVICE — SPONGE GAUZE 16PLY 4X4

## (undated) DEVICE — SEE MEDLINE ITEM 152622

## (undated) DEVICE — SUT ETHILON 6-0 BLK P-1 BLK

## (undated) DEVICE — ELECTRODE REM PLYHSV RETURN 9

## (undated) DEVICE — ADHESIVE MASTISOL VIAL 48/BX

## (undated) DEVICE — DRAPE STERI INSTRUMENT 1018

## (undated) DEVICE — DRESSING TELFA STRL 4X3 LF

## (undated) DEVICE — SPONGE PATTY SURGICAL .5X3IN

## (undated) DEVICE — SEE MEDLINE ITEM 152487

## (undated) DEVICE — DRAPE STERI-DRAPE 1000 17X11IN

## (undated) DEVICE — SUT PROLENE 6-0 P-1 18